# Patient Record
Sex: MALE | Race: WHITE | URBAN - METROPOLITAN AREA
[De-identification: names, ages, dates, MRNs, and addresses within clinical notes are randomized per-mention and may not be internally consistent; named-entity substitution may affect disease eponyms.]

---

## 2017-08-17 PROCEDURE — 82570 ASSAY OF URINE CREATININE: CPT | Performed by: INTERNAL MEDICINE

## 2017-08-17 PROCEDURE — 82043 UR ALBUMIN QUANTITATIVE: CPT | Performed by: INTERNAL MEDICINE

## 2017-08-21 PROBLEM — IMO0001 UNCONTROLLED TYPE 2 DIABETES MELLITUS WITHOUT COMPLICATION, WITHOUT LONG-TERM CURRENT USE OF INSULIN: Status: ACTIVE | Noted: 2017-08-21

## 2019-02-22 PROBLEM — Z72.0 TOBACCO ABUSE: Status: ACTIVE | Noted: 2019-02-22

## 2019-02-26 PROCEDURE — 82570 ASSAY OF URINE CREATININE: CPT | Performed by: INTERNAL MEDICINE

## 2019-02-26 PROCEDURE — 82043 UR ALBUMIN QUANTITATIVE: CPT | Performed by: INTERNAL MEDICINE

## 2019-02-26 PROCEDURE — 81003 URINALYSIS AUTO W/O SCOPE: CPT | Performed by: INTERNAL MEDICINE

## 2019-12-03 PROBLEM — E11.65 UNCONTROLLED TYPE 2 DIABETES MELLITUS WITH COMPLICATION (HCC): Status: ACTIVE | Noted: 2017-08-21

## 2019-12-03 PROBLEM — IMO0002 UNCONTROLLED TYPE 2 DIABETES MELLITUS WITH COMPLICATION: Status: ACTIVE | Noted: 2017-08-21

## 2019-12-03 PROBLEM — E11.8 UNCONTROLLED TYPE 2 DIABETES MELLITUS WITH COMPLICATION (HCC): Status: ACTIVE | Noted: 2017-08-21

## 2021-03-31 PROBLEM — R60.0 BILATERAL LEG EDEMA: Status: ACTIVE | Noted: 2021-03-31

## 2021-03-31 PROBLEM — E11.42 DIABETIC PERIPHERAL NEUROPATHY ASSOCIATED WITH TYPE 2 DIABETES MELLITUS (HCC): Status: ACTIVE | Noted: 2021-03-31

## 2022-02-28 PROBLEM — Z91.199 MEDICALLY NONCOMPLIANT: Status: ACTIVE | Noted: 2022-02-28

## 2024-02-21 NOTE — H&P (VIEW-ONLY)
ORTHOPEDIC SURGERY CLINIC H+P     Patient Name:Michael Moon Jr.  Date of Appointment: 02/21/24   Chief Complaint: Patient presents with:  Left Elbow - Worker's Comp, Pain    WORKERS COMPENSATION    Subjective:   HPI:    The patient is a 43 year old year old male     Previously evaluated at PCP office    Hand Surgery Intake    Occupation: [employer]   Hand Dominance: Ambidextrous    Chief Complaint and symptoms:   Onset 1/9/24  SAM - fell off ladder  Numbness and Tingling: medial elbow sensitivity - jolts at times  Pain: medial and posterior elbow    Increased pain - end range extension and flexion of elbow, certain positions    Treatment so far: XR 2/16/24    Ibuprofen - not using anymore     Things to check for in chart if considering surgery  Blood thinners: No  Diabetes: Metforim, HgbA1C 7.6 on 2/16/24  Smoking: Current (1/2 day)  EMG: no     Past Medical History  Past Medical History:   Diagnosis Date   • Alcoholic cirrhosis (HCC) Stopped ETOH 5/15.  Portal HTN, non bleeding varices on CT   • Allergic rhinitis    • Diabetes type II    • Erectile dysfunction    • hypertension    • JANET  DMG TX 12-21-10    SaO2  CPAP 10  HME       Past Surgical History  No past surgical history on file.    Medications    Current Outpatient Medications:   •  Sildenafil Citrate 100 MG Oral Tab, Take 1 tablet (100 mg total) by mouth daily as needed for Erectile Dysfunction., Disp: 60 tablet, Rfl: 1  •  mirtazapine 15 MG Oral Tab, Take 1 tablet (15 mg total) by mouth nightly. Take 10pm, Disp: 60 tablet, Rfl: 1  •  lisinopril 5 MG Oral Tab, Take 1 tablet (5 mg total) by mouth daily., Disp: 90 tablet, Rfl: 1  •  pioglitazone 30 MG Oral Tab, Take 1 tablet (30 mg total) by mouth daily., Disp: 90 tablet, Rfl: 1  •  metFORMIN  MG Oral Tablet 24 Hr, Take 2 tablets (1,500 mg total) by mouth daily with breakfast., Disp: 180 tablet, Rfl: 1  •  glimepiride 4 MG Oral Tab, Take 8mg with evening meal, Disp: 180 tablet, Rfl: 1  •  CONTOUR  NEXT TEST In Vitro Strip, Check blood sugars twice daily., Disp: 200 each, Rfl: 3  •  atorvastatin 10 MG Oral Tab, Take 1 tablet (10 mg total) by mouth daily., Disp: 90 tablet, Rfl: 3    Allergies  No Known Allergies    Social History  Social History    Tobacco Use      Smoking status: Every Day        Packs/day: 1.00        Years: 6.00        Additional pack years: 0.00        Total pack years: 6.00        Types: Cigarettes      Smokeless tobacco: Never    Vaping Use      Vaping Use: Never used    Alcohol use: No      Alcohol/week: 0.0 standard drinks of alcohol      Comment: 1-2 BOTTLES OF RUM    Drug use: No       Family History:  Family History   Problem Relation Age of Onset   • Lipids Father    • Gastro-Intestinal Disorder Mother         Crohn's disease       Physical Exam:     Estimated body mass index is 22.61 kg/m² as calculated from the following:    Height as of 2/16/24: 6' 1\" (1.854 m).    Weight as of 2/16/24: 171 lb 6.4 oz (77.7 kg).    Left Upper extremity exam:    Pertinent Exam Findings  Left elbow with swelling and gap at olecranon, inability to extend arm  Distally NVI    Diagnostic Studies:      XR Left elbow with PCP 2/16/24  IMPRESSION:   Fracture of the olecranon process with intra-articular extension and with proximal displacement of   the olecranon by about 1.6 cm.     Diagnostic studies were reviewed with the patient     Assessment/Plan      This 43 year old year old male presents with the following diagnoses and plan:    1. Olecranon fracture, left, closed, initial encounter    Left elbow olecranon fracture, subacute  I discussed options, there is articular involvement and displacement with no triceps function. He opted to move forward with surgery    To OR for left elbow olecranon ORIF    Left elbow ORIF   Work Comp  Plan for abx after surgery due to risk  PCP clearance    The patient understands the natural history of their disease and all their options.   Risks and benefits, as well as  alternatives, of surgery were discussed in detail.   Risks including infection, bleeding, damage to nerves, arteries, tendons, persistent pain, failure of surgery to relieve all pain/dysfunction/deformity, malunion, nonunion (if a bony injury), recurrence of problem, and need for further surgery were all discussed.  Risks for anesthesia complications, DVT and death were also discussed  All questions were answered and they agree to the plan.      Ottoniel Koch MD, FAAOS  [facility] Certified Orthopedic Surgeon  Board Certified Hand and Upper Extremity Specialist  [phone] Scheduling Line  [email or web address]

## 2024-02-28 RX ORDER — MIRTAZAPINE 15 MG/1
15 TABLET, FILM COATED ORAL NIGHTLY
COMMUNITY

## 2024-02-28 RX ORDER — ACETAMINOPHEN 500 MG
1000 TABLET ORAL ONCE
Status: CANCELLED | OUTPATIENT
Start: 2024-02-28 | End: 2024-02-28

## 2024-03-04 ENCOUNTER — HOSPITAL ENCOUNTER (OUTPATIENT)
Facility: HOSPITAL | Age: 44
Setting detail: HOSPITAL OUTPATIENT SURGERY
Discharge: HOME | End: 2024-03-04
Attending: ORTHOPAEDIC SURGERY | Admitting: ORTHOPAEDIC SURGERY
Payer: OTHER MISCELLANEOUS

## 2024-03-04 ENCOUNTER — APPOINTMENT (OUTPATIENT)
Dept: GENERAL RADIOLOGY | Facility: HOSPITAL | Age: 44
End: 2024-03-04
Attending: ORTHOPAEDIC SURGERY
Payer: OTHER MISCELLANEOUS

## 2024-03-04 ENCOUNTER — ANESTHESIA EVENT (OUTPATIENT)
Dept: SURGERY | Facility: HOSPITAL | Age: 44
End: 2024-03-04
Payer: OTHER MISCELLANEOUS

## 2024-03-04 ENCOUNTER — ANESTHESIA (OUTPATIENT)
Dept: SURGERY | Facility: HOSPITAL | Age: 44
End: 2024-03-04
Payer: OTHER MISCELLANEOUS

## 2024-03-04 VITALS
WEIGHT: 182 LBS | RESPIRATION RATE: 18 BRPM | DIASTOLIC BLOOD PRESSURE: 79 MMHG | OXYGEN SATURATION: 95 % | SYSTOLIC BLOOD PRESSURE: 134 MMHG | HEART RATE: 94 BPM | BODY MASS INDEX: 24.12 KG/M2 | HEIGHT: 73 IN | TEMPERATURE: 97 F

## 2024-03-04 DIAGNOSIS — S52.022A OLECRANON FRACTURE, LEFT, CLOSED, INITIAL ENCOUNTER: Primary | ICD-10-CM

## 2024-03-04 LAB
ANTIBODY SCREEN: NEGATIVE
GLUCOSE BLD-MCNC: 119 MG/DL (ref 70–99)
GLUCOSE BLD-MCNC: 194 MG/DL (ref 70–99)
RH BLOOD TYPE: POSITIVE

## 2024-03-04 PROCEDURE — 86900 BLOOD TYPING SEROLOGIC ABO: CPT | Performed by: ORTHOPAEDIC SURGERY

## 2024-03-04 PROCEDURE — 36430 TRANSFUSION BLD/BLD COMPNT: CPT | Performed by: ANESTHESIOLOGY

## 2024-03-04 PROCEDURE — 82962 GLUCOSE BLOOD TEST: CPT

## 2024-03-04 PROCEDURE — 86850 RBC ANTIBODY SCREEN: CPT | Performed by: ORTHOPAEDIC SURGERY

## 2024-03-04 PROCEDURE — 86901 BLOOD TYPING SEROLOGIC RH(D): CPT | Performed by: ORTHOPAEDIC SURGERY

## 2024-03-04 PROCEDURE — 76000 FLUOROSCOPY <1 HR PHYS/QHP: CPT | Performed by: ORTHOPAEDIC SURGERY

## 2024-03-04 PROCEDURE — 0PSL04Z REPOSITION LEFT ULNA WITH INTERNAL FIXATION DEVICE, OPEN APPROACH: ICD-10-PCS | Performed by: ORTHOPAEDIC SURGERY

## 2024-03-04 PROCEDURE — 76942 ECHO GUIDE FOR BIOPSY: CPT | Performed by: ANESTHESIOLOGY

## 2024-03-04 DEVICE — IMPLANTABLE DEVICE: Type: IMPLANTABLE DEVICE | Site: ARM | Status: FUNCTIONAL

## 2024-03-04 DEVICE — IMPLANTABLE DEVICE
Type: IMPLANTABLE DEVICE | Site: ARM | Status: NON-FUNCTIONAL
Removed: 2024-03-22

## 2024-03-04 DEVICE — DBX PUTTY, 1CC
Type: IMPLANTABLE DEVICE | Site: ARM | Status: FUNCTIONAL
Brand: DBX®

## 2024-03-04 RX ORDER — CEFAZOLIN SODIUM/WATER 2 G/20 ML
2 SYRINGE (ML) INTRAVENOUS ONCE
Status: COMPLETED | OUTPATIENT
Start: 2024-03-04 | End: 2024-03-04

## 2024-03-04 RX ORDER — HYDROMORPHONE HYDROCHLORIDE 1 MG/ML
0.6 INJECTION, SOLUTION INTRAMUSCULAR; INTRAVENOUS; SUBCUTANEOUS EVERY 5 MIN PRN
Status: DISCONTINUED | OUTPATIENT
Start: 2024-03-04 | End: 2024-03-04

## 2024-03-04 RX ORDER — SODIUM CHLORIDE 9 MG/ML
INJECTION, SOLUTION INTRAVENOUS CONTINUOUS PRN
Status: DISCONTINUED | OUTPATIENT
Start: 2024-03-04 | End: 2024-03-04 | Stop reason: SURG

## 2024-03-04 RX ORDER — LIDOCAINE HYDROCHLORIDE 10 MG/ML
INJECTION, SOLUTION EPIDURAL; INFILTRATION; INTRACAUDAL; PERINEURAL AS NEEDED
Status: DISCONTINUED | OUTPATIENT
Start: 2024-03-04 | End: 2024-03-04 | Stop reason: SURG

## 2024-03-04 RX ORDER — SCOLOPAMINE TRANSDERMAL SYSTEM 1 MG/1
1 PATCH, EXTENDED RELEASE TRANSDERMAL ONCE
Status: DISCONTINUED | OUTPATIENT
Start: 2024-03-04 | End: 2024-03-04 | Stop reason: HOSPADM

## 2024-03-04 RX ORDER — OXYCODONE HYDROCHLORIDE 5 MG/1
5 TABLET ORAL EVERY 4 HOURS PRN
Qty: 30 TABLET | Refills: 0 | Status: SHIPPED | OUTPATIENT
Start: 2024-03-04

## 2024-03-04 RX ORDER — OXYCODONE HYDROCHLORIDE 5 MG/1
5 CAPSULE ORAL EVERY 4 HOURS PRN
Qty: 25 CAPSULE | Refills: 0 | Status: SHIPPED | OUTPATIENT
Start: 2024-03-04 | End: 2024-03-04

## 2024-03-04 RX ORDER — DEXTROSE MONOHYDRATE 25 G/50ML
50 INJECTION, SOLUTION INTRAVENOUS
Status: DISCONTINUED | OUTPATIENT
Start: 2024-03-04 | End: 2024-03-04 | Stop reason: HOSPADM

## 2024-03-04 RX ORDER — SODIUM CHLORIDE, SODIUM LACTATE, POTASSIUM CHLORIDE, CALCIUM CHLORIDE 600; 310; 30; 20 MG/100ML; MG/100ML; MG/100ML; MG/100ML
INJECTION, SOLUTION INTRAVENOUS CONTINUOUS
Status: DISCONTINUED | OUTPATIENT
Start: 2024-03-04 | End: 2024-03-04

## 2024-03-04 RX ORDER — DEXAMETHASONE SODIUM PHOSPHATE 10 MG/ML
INJECTION, SOLUTION INTRAMUSCULAR; INTRAVENOUS AS NEEDED
Status: DISCONTINUED | OUTPATIENT
Start: 2024-03-04 | End: 2024-03-04 | Stop reason: SURG

## 2024-03-04 RX ORDER — MIDAZOLAM HYDROCHLORIDE 1 MG/ML
INJECTION INTRAMUSCULAR; INTRAVENOUS AS NEEDED
Status: DISCONTINUED | OUTPATIENT
Start: 2024-03-04 | End: 2024-03-04 | Stop reason: SURG

## 2024-03-04 RX ORDER — NALOXONE HYDROCHLORIDE 0.4 MG/ML
80 INJECTION, SOLUTION INTRAMUSCULAR; INTRAVENOUS; SUBCUTANEOUS AS NEEDED
Status: DISCONTINUED | OUTPATIENT
Start: 2024-03-04 | End: 2024-03-04

## 2024-03-04 RX ORDER — ROPIVACAINE HYDROCHLORIDE 5 MG/ML
INJECTION, SOLUTION EPIDURAL; INFILTRATION; PERINEURAL AS NEEDED
Status: DISCONTINUED | OUTPATIENT
Start: 2024-03-04 | End: 2024-03-04 | Stop reason: SURG

## 2024-03-04 RX ORDER — MEPERIDINE HYDROCHLORIDE 25 MG/ML
12.5 INJECTION INTRAMUSCULAR; INTRAVENOUS; SUBCUTANEOUS AS NEEDED
Status: DISCONTINUED | OUTPATIENT
Start: 2024-03-04 | End: 2024-03-04

## 2024-03-04 RX ORDER — HYDROMORPHONE HYDROCHLORIDE 1 MG/ML
0.2 INJECTION, SOLUTION INTRAMUSCULAR; INTRAVENOUS; SUBCUTANEOUS EVERY 5 MIN PRN
Status: DISCONTINUED | OUTPATIENT
Start: 2024-03-04 | End: 2024-03-04

## 2024-03-04 RX ORDER — ONDANSETRON 2 MG/ML
4 INJECTION INTRAMUSCULAR; INTRAVENOUS EVERY 6 HOURS PRN
Status: DISCONTINUED | OUTPATIENT
Start: 2024-03-04 | End: 2024-03-04

## 2024-03-04 RX ORDER — DIPHENHYDRAMINE HYDROCHLORIDE 50 MG/ML
12.5 INJECTION INTRAMUSCULAR; INTRAVENOUS AS NEEDED
Status: DISCONTINUED | OUTPATIENT
Start: 2024-03-04 | End: 2024-03-04

## 2024-03-04 RX ORDER — PROCHLORPERAZINE EDISYLATE 5 MG/ML
5 INJECTION INTRAMUSCULAR; INTRAVENOUS EVERY 8 HOURS PRN
Status: DISCONTINUED | OUTPATIENT
Start: 2024-03-04 | End: 2024-03-04

## 2024-03-04 RX ORDER — NICOTINE POLACRILEX 4 MG
30 LOZENGE BUCCAL
Status: DISCONTINUED | OUTPATIENT
Start: 2024-03-04 | End: 2024-03-04 | Stop reason: HOSPADM

## 2024-03-04 RX ORDER — ONDANSETRON 2 MG/ML
INJECTION INTRAMUSCULAR; INTRAVENOUS AS NEEDED
Status: DISCONTINUED | OUTPATIENT
Start: 2024-03-04 | End: 2024-03-04 | Stop reason: SURG

## 2024-03-04 RX ORDER — NICOTINE POLACRILEX 4 MG
15 LOZENGE BUCCAL
Status: DISCONTINUED | OUTPATIENT
Start: 2024-03-04 | End: 2024-03-04 | Stop reason: HOSPADM

## 2024-03-04 RX ORDER — MIDAZOLAM HYDROCHLORIDE 1 MG/ML
1 INJECTION INTRAMUSCULAR; INTRAVENOUS EVERY 5 MIN PRN
Status: DISCONTINUED | OUTPATIENT
Start: 2024-03-04 | End: 2024-03-04

## 2024-03-04 RX ORDER — HYDROMORPHONE HYDROCHLORIDE 1 MG/ML
0.4 INJECTION, SOLUTION INTRAMUSCULAR; INTRAVENOUS; SUBCUTANEOUS EVERY 5 MIN PRN
Status: DISCONTINUED | OUTPATIENT
Start: 2024-03-04 | End: 2024-03-04

## 2024-03-04 RX ORDER — DEXAMETHASONE SODIUM PHOSPHATE 4 MG/ML
VIAL (ML) INJECTION AS NEEDED
Status: DISCONTINUED | OUTPATIENT
Start: 2024-03-04 | End: 2024-03-04 | Stop reason: SURG

## 2024-03-04 RX ORDER — LABETALOL HYDROCHLORIDE 5 MG/ML
10 INJECTION, SOLUTION INTRAVENOUS EVERY 10 MIN PRN
Status: DISCONTINUED | OUTPATIENT
Start: 2024-03-04 | End: 2024-03-04

## 2024-03-04 RX ORDER — OXYCODONE HYDROCHLORIDE 5 MG/1
5 TABLET ORAL ONCE AS NEEDED
Status: DISCONTINUED | OUTPATIENT
Start: 2024-03-04 | End: 2024-03-04

## 2024-03-04 RX ADMIN — LIDOCAINE HYDROCHLORIDE 25 MG: 10 INJECTION, SOLUTION EPIDURAL; INFILTRATION; INTRACAUDAL; PERINEURAL at 10:40:00

## 2024-03-04 RX ADMIN — CEFAZOLIN SODIUM/WATER 2 G: 2 G/20 ML SYRINGE (ML) INTRAVENOUS at 10:46:00

## 2024-03-04 RX ADMIN — MIDAZOLAM HYDROCHLORIDE 2 MG: 1 INJECTION INTRAMUSCULAR; INTRAVENOUS at 10:33:00

## 2024-03-04 RX ADMIN — DEXAMETHASONE SODIUM PHOSPHATE 2 MG: 10 INJECTION, SOLUTION INTRAMUSCULAR; INTRAVENOUS at 10:38:00

## 2024-03-04 RX ADMIN — ROPIVACAINE HYDROCHLORIDE 25 ML: 5 INJECTION, SOLUTION EPIDURAL; INFILTRATION; PERINEURAL at 10:38:00

## 2024-03-04 RX ADMIN — DEXAMETHASONE SODIUM PHOSPHATE 8 MG: 4 MG/ML VIAL (ML) INJECTION at 10:58:00

## 2024-03-04 RX ADMIN — SODIUM CHLORIDE: 9 INJECTION, SOLUTION INTRAVENOUS at 10:31:00

## 2024-03-04 RX ADMIN — ONDANSETRON 4 MG: 2 INJECTION INTRAMUSCULAR; INTRAVENOUS at 11:31:00

## 2024-03-04 NOTE — ANESTHESIA PREPROCEDURE EVALUATION
PRE-OP EVALUATION    Patient Name: Michael Moon Jr.    Admit Diagnosis: OLECRANON FRACTURE, LEFT CLOSED, INITIAL ENCOUNTER    Pre-op Diagnosis: OLECRANON FRACTURE, LEFT CLOSED, INITIAL ENCOUNTER    LEFT ELBOW OLECRANON OPEN REDUCTION INTERNAL FIXATION    Anesthesia Procedure: LEFT ELBOW OLECRANON OPEN REDUCTION INTERNAL FIXATION (Left)    Surgeon(s) and Role:     * Ottoniel Koch MD - Primary    Pre-op vitals reviewed.  Temp: 98.4 °F (36.9 °C)  Pulse: 91  Resp: 16  BP: 136/87  SpO2: 99 %  Body mass index is 24.01 kg/m².    Current medications reviewed.  Hospital Medications:   scopolamine (Transderm-Scop) 1 MG/3DAYS patch 1 patch  1 patch Transdermal Once    glucose (Dex4) 15 GM/59ML oral liquid 15 g  15 g Oral Q15 Min PRN    Or    glucose (Glutose) 40% oral gel 15 g  15 g Oral Q15 Min PRN    Or    glucose-vitamin C (Dex-4) chewable tab 4 tablet  4 tablet Oral Q15 Min PRN    Or    dextrose 50% injection 50 mL  50 mL Intravenous Q15 Min PRN    Or    glucose (Dex4) 15 GM/59ML oral liquid 30 g  30 g Oral Q15 Min PRN    Or    glucose (Glutose) 40% oral gel 30 g  30 g Oral Q15 Min PRN    Or    glucose-vitamin C (Dex-4) chewable tab 8 tablet  8 tablet Oral Q15 Min PRN    lactated ringers infusion   Intravenous Continuous    ceFAZolin (Ancef) 2 g in 20mL IV syringe premix  2 g Intravenous Once       Outpatient Medications:     Medications Prior to Admission   Medication Sig Dispense Refill Last Dose    mirtazapine 15 MG Oral Tab Take 1 tablet (15 mg total) by mouth nightly.   Past Week    Sildenafil Citrate 100 MG Oral Tab Take 1 tablet (100 mg total) by mouth daily as needed for Erectile Dysfunction. 60 tablet 3     lisinopril 5 MG Oral Tab Take 1 tablet (5 mg total) by mouth daily. 90 tablet 3 3/3/2024 at 0800    pioglitazone 30 MG Oral Tab Take 1 tablet (30 mg total) by mouth daily. 90 tablet 3 3/3/2024 at 0800    glimepiride 4 MG Oral Tab Take 2 tablets (8 mg total) by mouth once daily. 180 tablet 3 3/3/2024 at  0800    metFORMIN HCl  MG Oral Tablet 24 Hr Take 2 tablets (1,500 mg total) by mouth once daily. 180 tablet 3 3/3/2024 at 0800    Insulin Pen Needle (PEN NEEDLES 5/16\") 31G X 8 MM Does not apply Misc Use daily with insulin 100 each 3     Elastic Bandages & Supports (JOBST FOR MEN 15-20MMHG LG) Does not apply Misc Wear both legs daily 4 each 5     FREESTYLE LITE TEST In Vitro Strip TEST DAILY 100 strip 3     Blood Glucose Monitoring Suppl (FREESTYLE FREEDOM LITE) w/Device Does not apply Kit Test blood sugars once daily 1 kit 0     FREESTYLE LANCETS Does not apply Misc 1 each by Finger stick route daily. 100 each 2        Allergies: Patient has no known allergies.      Anesthesia Evaluation    Patient summary reviewed.    Anesthetic Complications  (-) history of anesthetic complications         GI/Hepatic/Renal                (+) liver disease (alcoholic cirrhosis.  Stopped ETOH 5/15.  Portal HTN, non bleeding varices on CT)                 Cardiovascular      ECG reviewed.  Exercise tolerance: good     MET: >4      (+) hypertension                                     Endo/Other      (+) diabetes and poorly controlled, type 2, not using insulin              (+) thrombocytopenia           Pulmonary  Comment: Smokes 1ppd for 6 years                  (+) sleep apnea and CPAP      Neuro/Psych      (+) depression  (+) anxiety         (+) neuromuscular disease (diabteic neuropathy)                   Past Surgical History:   Procedure Laterality Date    SINUS SURGERY        WISDOM TEETH REMOVED       Social History     Socioeconomic History    Marital status: Single   Tobacco Use    Smoking status: Every Day     Packs/day: 0.50     Years: 6.00     Additional pack years: 0.00     Total pack years: 3.00     Types: Cigarettes    Smokeless tobacco: Never   Vaping Use    Vaping Use: Never used   Substance and Sexual Activity    Alcohol use: No     Alcohol/week: 0.0 standard drinks of alcohol     Comment: 1-2 BOTTLES OF RUM     Drug use: No     History   Drug Use No     Available pre-op labs reviewed.               Airway      Mallampati: II  Mouth opening: >3 FB  TM distance: > 6 cm  Neck ROM: full Cardiovascular    Cardiovascular exam normal.  Rhythm: regular  Rate: normal  (-) murmur   Dental    Dentition appears grossly intact         Pulmonary    Pulmonary exam normal.  Breath sounds clear to auscultation bilaterally.               Other findings        ASA: 3   Plan: general  NPO status verified and patient meets guidelines.      Surgeon requests: regional block  Comment: GA discussed.  Risk of PONV, cough, sore throat explained.  Supraclavicular block discussed with patient. The risks and benefits of regional anesthesia have been explained to the patient as indicated on the anesthesia consent form, which has been signed. All questions answered.        Plan/risks discussed with: patient  Use of blood product(s) discussed with: patient (platelets ordered by surgeon)            Present on Admission:  **None**

## 2024-03-04 NOTE — INTERVAL H&P NOTE
Pre-op Diagnosis: OLECRANON FRACTURE, LEFT CLOSED, INITIAL ENCOUNTER    The above referenced H&P was reviewed by Ottoniel Koch MD on 3/4/2024, the patient was examined and no significant changes have occurred in the patient's condition since the H&P was performed.  I discussed with the patient and/or legal representative the potential benefits, risks and side effects of this procedure; the likelihood of the patient achieving goals; and potential problems that might occur during recuperation.  I discussed reasonable alternatives to the procedure, including risks, benefits and side effects related to the alternatives and risks related to not receiving this procedure.  We will proceed with procedure as planned.    1 pack FFP   1 pack platelets preop    Patient was seen and examined  Patient is in no acute distress  Regular rate and rhythm  Unlabored respiration    Ottoniel Koch MD  [facility] Certified Orthopedic Surgeon  Hand and Upper Extremity Specialist  [phone] Scheduling Line  [email or web address]

## 2024-03-04 NOTE — DISCHARGE INSTRUCTIONS
Ottoniel Koch MD  Hand and Upper Extremity Surgeon  [facility]  [email or web address]    Keep splint on and dry until Postop appointment or OT  Oxycodone for pain, transition to aleve    POST-OPERATIVE INSTRUCTIONS    Please visit [email or web address] to learn more about your condition, procedure, preoperative and postoperative care. There are also directions for postoperative pain control and home exercises. Look under the patient resources tab.    Follow up  Your follow up should have been made for you before the surgery  If you do not have a follow up day or have questions regarding it, call [phone]    Therapy:   If we discussed the need for therapy, they will reach out to you   Please call [phone] if they do not reach out to you in 2-3 days  If we discussed seeing therapy before your postoperative visit, please schedule accordingly    You may be placed in one of the following dressings after surgery:  (yes) Cast/Splint: please keep your cast/splint clean and dry until follow up. Place a bag over it when you shower. If this gets wet, please call our office immediately.       Showers  Keep dressing and/or wound dry until you see me in office    Activity  Please remain non-weight bearing to the operative extremity with only gentle activity, make a fist often and move fingers.    Driving  You should refrain from driving if you are on narcotic pain medications (Tramadol, Norco, Percocet… etc.). If you are off narcotic pain medications, you may start driving when you feel comfortable and safe doing so. Please use caution before starting to drive again.    Pain Medication Plan  Your pain medications should have been prescribed electronically to your pharmacy  The pain medications I have recommended for your procedure are Oxycodone, Transition to aleve or ibuprofen  Please call [phone] if you have issues with these medications    Over the counter pain medications  Start with over the counter  pain medications first.  Naproxen (Brand name Aleve)   Comes in 220 mg tablets  Take this once in the morning and once at night initially, with food  If this does not control your pain, you can take 2 pills in the morning and 2 at night.   Do not take this if you have medical conditions affecting your heart/kidneys  Docusate Sodium (Brand name Colace)  This medication will help you if you become constipated from narcotic pain medications    Medications you may have been prescribed  If you were prescribed Norco, Vicodin, or Percocet, these should be taken to assist with sleeping at night, only if needed, for the first week. You may be given a written prescription, but many patients find they do not need to take these. Narcotic pain medications can lead to addiction, respiratory sedation, and death. Try to take these as little as possible if you can.    While on narcotic pain medication,  please refrain from:  Driving  Operating Heavy Machinery/Power Tools  Drinking alcohol  Staying by one's self  Making important decisions or signing legal documents    If you have any of the following signs or symptoms please proceed to your nearest Emergency Room:   Chest Pain  Shortness of Breath/ Difficulty Breathing  Excessive Bleeding  Anything else you deem an emergency    Please call the surgical team if you have the following:   Excessive swelling  Foul smelling odor from your wounds or dressings  Discharge from your surgical wounds  Persistent pain that does not get better with the prescription pain medication  Persistent nausea and/or vomiting  Fevers greater than 101.1 after the first 48 hrs post op  Dramatic changes to you post operative pain    You can resume all of your home medications after surgery with the following exceptions:   Immune Modulating Drugs: These are drugs for rheumatoid arthritis, psoriasis and chemotherapy. If you are taking these drugs please check with the surgical team.   Steroids: please ask the  surgical team when to restart your normal steroid doses.   Blood thinners: these are medications like coumadin may be held post operatively in some cases, please check with your surgical team or your surgeon's nurse.      Please do not hesitate to contact us with questions or concerns:   If you have issues during the day, please call the orthopedic clinic at [phone]  If you have urgent questions after hours please call [phone] and ask to page the PA on call    Please visit [email or web address] to learn more about your condition, procedure, preoperative and postoperative care. There are also directions for postoperative pain control and home exercises.    If you’ve been happy with your care, please fill out our Press Ganey Survey emailed by [employer] or write a review on Google for me (link can be pulled up with the QR code below).   We appreciate all feedback!      Scan me or take a photo to write a Google Review!

## 2024-03-04 NOTE — OPERATIVE REPORT
OPERATIVE REPORT    Patient Name: Michael Moon Jr.  Age:  43 year old  Sex: male  MRN: [id number]  : 10/28/1980  Date of Admission: 3/4/2024  Date of Surgery: 24  Primary Surgeon: Ottoniel Koch MD  Assistant(s): None     Preoperative Diagnosis:   Left elbow olecranon fracture     Postoperative Diagnosis:   Above      Operation Performed: Left olecranon proximal ulna open reduction internal fixation, CPT code 89063     Implants: Skeletal dynamics proximal ulna plate     Anesthesia: General + regional    Complications: none    Estimated Blood Loss: 10 mL    Tourniquet Time: 50 minutes    Specimens: none    Antibiotics: given    INDICATIONS FOR SURGERY:   The patient is a 43 year old year-old male with left elbow olecranon fracture.  The risks of surgery include, but are not limited to, bleeding, infection, injury to neurovascular structures, DVT, PE, other medical complications. The patient understood all of these and gave consent to proceed freely.     OPERATIVE FINDINGS:  Left elbow olecranon fracture     OPERATIVE PROCEDURE:  The patient was seen in the preoperative holding area, where the correct site was marked.  All questions regarding the procedure and postoperative rehabilitation were discussed.  The consent was signed. H&P and allergies were reviewed.     The patient was brought to the operating room and transferred to the OR table where general Anesthesia was induced without immediate complication. The anesthesia team then performed a regional block on the upper extremity without complications. General anesthetic was then initiated by Anesthesia. A high axillary tourniquet that was well padded was placed on the operative arm. The patient was then positioned with a bump under the hip and the operative upper extremity was prepped and draped  the standard fashion. A single dose of preoperative antibiotics was administered. A time-out was performed confirming laterality and site.     A midline  incision overlying the olecranon and proximal ulna was designed and marked. This incision curved radially around the tip of the olecranon. The extremity was then exsanguinated at 250 mm hg. Skin was incised sharply. The subcutaneous tissue proximally was dissected with bovie cautery until the triceps musculature was encountered. Flaps ulnar and radially were lifted. Distally, the dissection was brought down to the distal ulna and the ECU and FCU fascia were lifted off the ulnar shaft     The fracture was encountered and fracture hematoma was washed away with irrigation as well as with a freer elevator.   The fracture was then reduced with a pointed reduction clamp and two K wires were fired in crossed fashion to hold the provisional reduction.     Reduction was confirmed by fluoroscopy. A left olecranon plate was then selected and placed on the ulna and secured with K wires. Plate position was confirmed and deemed adequate. A distal cortical screw was then placed to bring the plate to the bone and then the proximal home run screw was placed in locking fashion.      Xrays were then taken and screw and plate position were confirmed.   I placed 4 additional proximal locking screws  I placed 3 additional distal locking screws   Final films were taken and deemed adequate    A crossing 2-0 fiberwire locking stitch was placed through the ulna into the triceps     The wound was irrigated, the distal forearm fascia was closed with a running 2-0 vicryl, subdermal layer closed with 3-0 monocryl and then skin was closed with staples     Wound was dressed with bacitracin, adaptic, 4x4s and webril. A long arm well padded post mold splint was placed with a side slab.     The patient was allowed to awaken from the anesthetic, appeared to have tolerated the procedure well without immediate complication, and was transferred to the recovery room in stable condition.     Patient will follow up in 2 weeks for wound inspection. Norco  given for pain     Ottoniel Koch MD  Orthopedic Surgery

## 2024-03-04 NOTE — ANESTHESIA PROCEDURE NOTES
Airway  Date/Time: 3/4/2024 10:42 AM  Urgency: elective      General Information and Staff    Patient location during procedure: OR  Anesthesiologist: Jonnie Caro MD  Performed: anesthesiologist   Performed by: Jonnie Caro MD  Authorized by: Jonnie Caro MD      Indications and Patient Condition  Indications for airway management: anesthesia  Sedation level: deep  Preoxygenated: yes  Patient position: sniffing  Mask difficulty assessment: 1 - vent by mask    Final Airway Details  Final airway type: supraglottic airway      Successful airway: classic  Size 4       Number of attempts at approach: 1

## 2024-03-04 NOTE — ANESTHESIA PROCEDURE NOTES
Regional Block    Date/Time: 3/4/2024 10:31 AM    Performed by: Jonnie Caro MD  Authorized by: Jonnie Caro MD      General Information and Staff    Start Time:  3/4/2024 10:31 AM  End Time:  3/4/2024 10:39 AM  Anesthesiologist:  Jonnie Caro MD  Performed by:  Anesthesiologist  Patient Location:  OR    Block Placement: Pre Induction  Site Identification: real time ultrasound guided and image stored and retrievable    Block site/laterality marked before start: site marked  Reason for Block: at surgeon's request and post-op pain management    Preanesthetic Checklist: 2 patient identifers, IV checked, risks and benefits discussed, monitors and equipment checked, pre-op evaluation, timeout performed, anesthesia consent, sterile technique used, no prohibitive neurological deficits and no local skin infection at insertion site      Procedure Details    Patient Position:  Supine  Prep: ChloraPrep    Monitoring:  Cardiac monitor, continuous pulse ox and blood pressure cuff  Block Type:  Supraclavicular  Laterality:  Left  Injection Technique:  Single-shot    Needle    Needle Type:  Short-bevel and echogenic  Needle Gauge:  21 G  Needle Length:  100 mm  Needle Localization:  Ultrasound guidance  Reason for Ultrasound Use: appropriate spread of the medication was noted in real time and no ultrasound evidence of intravascular and/or intraneural injection            Assessment    Injection Assessment:  Good spread noted, negative resistance, negative aspiration for heme, incremental injection and low pressure  Heart Rate Change: No    - Patient tolerated block procedure well without evidence of immediate block related complications.     Medications  3/4/2024 10:31 AM      Additional Comments    Medication:  Ropivacaine 0.5% 25mL with 2 mg PF dexamethasone

## 2024-03-05 LAB
BLOOD TYPE BARCODE: 600
UNIT VOLUME: 208 ML

## 2024-03-07 LAB
BLOOD TYPE BARCODE: 9500
UNIT VOLUME: 302 ML

## 2024-03-19 NOTE — H&P (VIEW-ONLY)
ORTHOPEDIC SURGERY CLINIC NOTE     Patient Name: Michael Moon Jr.  Date of Appointment: 03/19/24   Surgery Performed:   Left elbow olecranon ORIF    Surgery Date: 3/1/24  Patient presents with:  Left Elbow - Post-Op, Worker's Comp     WORKER'S COMPENSATION    History of Present Illness  The patient is a 43 year old year old male s/p surgery listed above.    Patient reports LEFT elbow feeling okay. Minimal pain, currently not using any medications for pain. No numbness nor tingling. Swelling improved.     Had first OT visit 3/15/24. Continues to use splint. Notes that the splint seems to slip.    Denies new injury nor new symptoms, nor f/s/c/n/v.    No questionnaires on file.     Past Medical History:   Diagnosis Date   • Alcoholic cirrhosis (HCC) Stopped ETOH 5/15.  Portal HTN, non bleeding varices on CT   • Allergic rhinitis    • Anxiety state    • Depression    • Diabetes type II    • Erectile dysfunction    • Esophageal reflux    • High blood pressure    • High cholesterol    • hypertension    • Neuropathy     legs / feet   • JANET  DMG TX 12-21-10    SaO2  CPAP 10  HME        PHYSICAL EXAM     Upper extremity exam:  Left elbow with healed posterior incision, swelling appropriate, no infection   Distally NVI    Skin intact  Compartments soft  Motor Exam   Palmar Abduction, EPL, FPL, FDS and FDP, First dorsal interossei intact   Sensory Exam   Sensation intact to light touch to Median, Ulnar and Radial Nerve distribution  Radial/Ulnar Pulses 2+, Cap Refill < 2 seconds     Diagnostic Studies:      3 views of left elbow shows pull out of the olecranon piece as compared to the pre-operative xray    Diagnostic studies were reviewed with the patient     Assessment/Plan      This 43 year old year old male presents with the following diagnoses and plan:    1. Olecranon fracture, left, closed, with routine healing, subsequent encounter    2. S/P ORIF (open reduction internal fixation) fracture    I had a long  conversation with the patient  He has a failed olecranon ORIF, at some point between surgery and now, there was likely increased motion at the elbow and the olecranon piece pulled out from the plate.     I gave him options but would recommend revision surgery at this time    He was agreeable with this plan    Recommend left elbow revision olecranon open reduction internal fixation, plate removal, possible triceps advancement     Plan to stay in splint for 4 weeks to ensure healing before transitioning for OT    The patient understands the natural history of their disease and all their options.   Risks and benefits, as well as alternatives, of surgery were discussed in detail.   Risks including infection, bleeding, damage to nerves, arteries, tendons, persistent pain, failure of surgery to relieve all pain/dysfunction/deformity, malunion, nonunion (if a bony injury), recurrence of problem, and need for further surgery were all discussed.  Risks for anesthesia complications, DVT and death were also discussed  All questions were answered and they agree to the plan.       Ottoniel Koch MD, FAAOS  [facility] Certified Orthopedic Surgeon  Board Certified Hand and Upper Extremity Specialist  [phone] Scheduling Line  [email or web address]

## 2024-03-21 ENCOUNTER — ANESTHESIA EVENT (OUTPATIENT)
Dept: SURGERY | Facility: HOSPITAL | Age: 44
End: 2024-03-21
Payer: OTHER MISCELLANEOUS

## 2024-03-21 RX ORDER — SERTRALINE HYDROCHLORIDE 25 MG/1
25 TABLET, FILM COATED ORAL DAILY
COMMUNITY
Start: 2024-03-04

## 2024-03-21 NOTE — PAT NURSING NOTE
S/W Abigail at Dr. Koch's office. Abigail left [employer] message for patient. Informed her that  RN unable to reach patient to verify medical history.

## 2024-03-21 NOTE — PAT NURSING NOTE
LM with Dr. Koch's office [phone], RN unable to reach patient to verify medical history, medical screen not completed. Patient is scheduled at 1100 on 3/22/24

## 2024-03-22 ENCOUNTER — HOSPITAL ENCOUNTER (OUTPATIENT)
Facility: HOSPITAL | Age: 44
Setting detail: HOSPITAL OUTPATIENT SURGERY
Discharge: HOME | End: 2024-03-22
Attending: ORTHOPAEDIC SURGERY | Admitting: ORTHOPAEDIC SURGERY
Payer: OTHER MISCELLANEOUS

## 2024-03-22 ENCOUNTER — ANESTHESIA (OUTPATIENT)
Dept: SURGERY | Facility: HOSPITAL | Age: 44
End: 2024-03-22
Payer: OTHER MISCELLANEOUS

## 2024-03-22 ENCOUNTER — APPOINTMENT (OUTPATIENT)
Dept: GENERAL RADIOLOGY | Facility: HOSPITAL | Age: 44
End: 2024-03-22
Attending: ORTHOPAEDIC SURGERY
Payer: OTHER MISCELLANEOUS

## 2024-03-22 VITALS
WEIGHT: 180.19 LBS | RESPIRATION RATE: 14 BRPM | BODY MASS INDEX: 23.88 KG/M2 | HEIGHT: 73 IN | TEMPERATURE: 98 F | SYSTOLIC BLOOD PRESSURE: 121 MMHG | DIASTOLIC BLOOD PRESSURE: 72 MMHG | OXYGEN SATURATION: 95 % | HEART RATE: 78 BPM

## 2024-03-22 DIAGNOSIS — S52.022A OLECRANON FRACTURE, LEFT, CLOSED, INITIAL ENCOUNTER: Primary | ICD-10-CM

## 2024-03-22 LAB
ALBUMIN SERPL-MCNC: 2.9 G/DL (ref 3.4–5)
ALBUMIN/GLOB SERPL: 0.9 {RATIO} (ref 1–2)
ALP LIVER SERPL-CCNC: 175 U/L
ALT SERPL-CCNC: 24 U/L
ANION GAP SERPL CALC-SCNC: 5 MMOL/L (ref 0–18)
AST SERPL-CCNC: 19 U/L (ref 15–37)
BILIRUB SERPL-MCNC: 1 MG/DL (ref 0.1–2)
BUN BLD-MCNC: 9 MG/DL (ref 9–23)
CALCIUM BLD-MCNC: 8.8 MG/DL (ref 8.5–10.1)
CHLORIDE SERPL-SCNC: 112 MMOL/L (ref 98–112)
CO2 SERPL-SCNC: 25 MMOL/L (ref 21–32)
CREAT BLD-MCNC: 0.57 MG/DL
EGFRCR SERPLBLD CKD-EPI 2021: 126 ML/MIN/1.73M2 (ref 60–?)
GLOBULIN PLAS-MCNC: 3.3 G/DL (ref 2.8–4.4)
GLUCOSE BLD-MCNC: 134 MG/DL (ref 70–99)
GLUCOSE BLD-MCNC: 176 MG/DL (ref 70–99)
GLUCOSE BLD-MCNC: 190 MG/DL (ref 70–99)
OSMOLALITY SERPL CALC.SUM OF ELEC: 298 MOSM/KG (ref 275–295)
POTASSIUM SERPL-SCNC: 3.8 MMOL/L (ref 3.5–5.1)
PROT SERPL-MCNC: 6.2 G/DL (ref 6.4–8.2)
SODIUM SERPL-SCNC: 142 MMOL/L (ref 136–145)

## 2024-03-22 PROCEDURE — 82962 GLUCOSE BLOOD TEST: CPT

## 2024-03-22 PROCEDURE — 0LM40ZZ REATTACHMENT OF LEFT UPPER ARM TENDON, OPEN APPROACH: ICD-10-PCS | Performed by: ORTHOPAEDIC SURGERY

## 2024-03-22 PROCEDURE — 76942 ECHO GUIDE FOR BIOPSY: CPT | Performed by: ANESTHESIOLOGY

## 2024-03-22 PROCEDURE — 0PSL04Z REPOSITION LEFT ULNA WITH INTERNAL FIXATION DEVICE, OPEN APPROACH: ICD-10-PCS | Performed by: ORTHOPAEDIC SURGERY

## 2024-03-22 PROCEDURE — 76000 FLUOROSCOPY <1 HR PHYS/QHP: CPT | Performed by: ORTHOPAEDIC SURGERY

## 2024-03-22 PROCEDURE — 80053 COMPREHEN METABOLIC PANEL: CPT | Performed by: ANESTHESIOLOGY

## 2024-03-22 DEVICE — IMPLANTABLE DEVICE: Type: IMPLANTABLE DEVICE | Site: ARM | Status: FUNCTIONAL

## 2024-03-22 DEVICE — DEMINERALIZED BONE MATRIX (DBM) IN A LIPID CARRIER
Type: IMPLANTABLE DEVICE | Site: ARM | Status: FUNCTIONAL
Brand: STAGRAFT DBM PUTTY

## 2024-03-22 DEVICE — K WIRE 1.6X150MM TRCR PT SS: Type: IMPLANTABLE DEVICE | Site: ARM

## 2024-03-22 DEVICE — SCREW BNE 3.5X30MM CORT HEX DRV RECESS FT ST: Type: IMPLANTABLE DEVICE | Site: ARM | Status: FUNCTIONAL

## 2024-03-22 DEVICE — SCREW BNE 2.7X18MM VA T8 STARDRV RECESS LOK: Type: IMPLANTABLE DEVICE | Site: ARM | Status: FUNCTIONAL

## 2024-03-22 DEVICE — IB KIT, BC, W/ CC FT AND JUMPSTART
Type: IMPLANTABLE DEVICE | Site: ARM | Status: FUNCTIONAL
Brand: ARTHREX®

## 2024-03-22 RX ORDER — METOCLOPRAMIDE HYDROCHLORIDE 5 MG/ML
INJECTION INTRAMUSCULAR; INTRAVENOUS AS NEEDED
Status: DISCONTINUED | OUTPATIENT
Start: 2024-03-22 | End: 2024-03-22 | Stop reason: SURG

## 2024-03-22 RX ORDER — INSULIN ASPART 100 [IU]/ML
INJECTION, SOLUTION INTRAVENOUS; SUBCUTANEOUS ONCE
Status: DISCONTINUED | OUTPATIENT
Start: 2024-03-22 | End: 2024-03-22

## 2024-03-22 RX ORDER — HYDROMORPHONE HYDROCHLORIDE 1 MG/ML
0.6 INJECTION, SOLUTION INTRAMUSCULAR; INTRAVENOUS; SUBCUTANEOUS EVERY 5 MIN PRN
Status: DISCONTINUED | OUTPATIENT
Start: 2024-03-22 | End: 2024-03-22

## 2024-03-22 RX ORDER — DEXAMETHASONE SODIUM PHOSPHATE 10 MG/ML
INJECTION, SOLUTION INTRAMUSCULAR; INTRAVENOUS AS NEEDED
Status: DISCONTINUED | OUTPATIENT
Start: 2024-03-22 | End: 2024-03-22 | Stop reason: SURG

## 2024-03-22 RX ORDER — HYDROMORPHONE HYDROCHLORIDE 1 MG/ML
0.4 INJECTION, SOLUTION INTRAMUSCULAR; INTRAVENOUS; SUBCUTANEOUS EVERY 5 MIN PRN
Status: DISCONTINUED | OUTPATIENT
Start: 2024-03-22 | End: 2024-03-22

## 2024-03-22 RX ORDER — ACETAMINOPHEN 500 MG
1000 TABLET ORAL ONCE AS NEEDED
Status: DISCONTINUED | OUTPATIENT
Start: 2024-03-22 | End: 2024-03-22

## 2024-03-22 RX ORDER — NICOTINE POLACRILEX 4 MG
30 LOZENGE BUCCAL
Status: DISCONTINUED | OUTPATIENT
Start: 2024-03-22 | End: 2024-03-22 | Stop reason: HOSPADM

## 2024-03-22 RX ORDER — SCOLOPAMINE TRANSDERMAL SYSTEM 1 MG/1
1 PATCH, EXTENDED RELEASE TRANSDERMAL ONCE
Status: DISCONTINUED | OUTPATIENT
Start: 2024-03-22 | End: 2024-03-22 | Stop reason: HOSPADM

## 2024-03-22 RX ORDER — LIDOCAINE HYDROCHLORIDE 10 MG/ML
INJECTION, SOLUTION EPIDURAL; INFILTRATION; INTRACAUDAL; PERINEURAL AS NEEDED
Status: DISCONTINUED | OUTPATIENT
Start: 2024-03-22 | End: 2024-03-22 | Stop reason: SURG

## 2024-03-22 RX ORDER — SODIUM CHLORIDE, SODIUM LACTATE, POTASSIUM CHLORIDE, CALCIUM CHLORIDE 600; 310; 30; 20 MG/100ML; MG/100ML; MG/100ML; MG/100ML
INJECTION, SOLUTION INTRAVENOUS CONTINUOUS
Status: DISCONTINUED | OUTPATIENT
Start: 2024-03-22 | End: 2024-03-22

## 2024-03-22 RX ORDER — ONDANSETRON 2 MG/ML
INJECTION INTRAMUSCULAR; INTRAVENOUS AS NEEDED
Status: DISCONTINUED | OUTPATIENT
Start: 2024-03-22 | End: 2024-03-22 | Stop reason: SURG

## 2024-03-22 RX ORDER — ROPIVACAINE HYDROCHLORIDE 5 MG/ML
INJECTION, SOLUTION EPIDURAL; INFILTRATION; PERINEURAL AS NEEDED
Status: DISCONTINUED | OUTPATIENT
Start: 2024-03-22 | End: 2024-03-22 | Stop reason: SURG

## 2024-03-22 RX ORDER — HYDROCODONE BITARTRATE AND ACETAMINOPHEN 5; 325 MG/1; MG/1
2 TABLET ORAL ONCE AS NEEDED
Status: DISCONTINUED | OUTPATIENT
Start: 2024-03-22 | End: 2024-03-22

## 2024-03-22 RX ORDER — HYDROCODONE BITARTRATE AND ACETAMINOPHEN 5; 325 MG/1; MG/1
1 TABLET ORAL EVERY 6 HOURS PRN
Qty: 20 TABLET | Refills: 0 | Status: SHIPPED | OUTPATIENT
Start: 2024-03-22

## 2024-03-22 RX ORDER — DEXTROSE MONOHYDRATE 25 G/50ML
50 INJECTION, SOLUTION INTRAVENOUS
Status: DISCONTINUED | OUTPATIENT
Start: 2024-03-22 | End: 2024-03-22 | Stop reason: HOSPADM

## 2024-03-22 RX ORDER — HYDROMORPHONE HYDROCHLORIDE 1 MG/ML
0.2 INJECTION, SOLUTION INTRAMUSCULAR; INTRAVENOUS; SUBCUTANEOUS EVERY 5 MIN PRN
Status: DISCONTINUED | OUTPATIENT
Start: 2024-03-22 | End: 2024-03-22

## 2024-03-22 RX ORDER — PROCHLORPERAZINE EDISYLATE 5 MG/ML
5 INJECTION INTRAMUSCULAR; INTRAVENOUS EVERY 8 HOURS PRN
Status: DISCONTINUED | OUTPATIENT
Start: 2024-03-22 | End: 2024-03-22

## 2024-03-22 RX ORDER — ONDANSETRON 2 MG/ML
4 INJECTION INTRAMUSCULAR; INTRAVENOUS EVERY 6 HOURS PRN
Status: DISCONTINUED | OUTPATIENT
Start: 2024-03-22 | End: 2024-03-22

## 2024-03-22 RX ORDER — NALOXONE HYDROCHLORIDE 0.4 MG/ML
0.08 INJECTION, SOLUTION INTRAMUSCULAR; INTRAVENOUS; SUBCUTANEOUS AS NEEDED
Status: DISCONTINUED | OUTPATIENT
Start: 2024-03-22 | End: 2024-03-22

## 2024-03-22 RX ORDER — DIPHENHYDRAMINE HYDROCHLORIDE 50 MG/ML
12.5 INJECTION INTRAMUSCULAR; INTRAVENOUS AS NEEDED
Status: DISCONTINUED | OUTPATIENT
Start: 2024-03-22 | End: 2024-03-22

## 2024-03-22 RX ORDER — HYDROCODONE BITARTRATE AND ACETAMINOPHEN 5; 325 MG/1; MG/1
1 TABLET ORAL ONCE AS NEEDED
Status: DISCONTINUED | OUTPATIENT
Start: 2024-03-22 | End: 2024-03-22

## 2024-03-22 RX ORDER — NICOTINE POLACRILEX 4 MG
15 LOZENGE BUCCAL
Status: DISCONTINUED | OUTPATIENT
Start: 2024-03-22 | End: 2024-03-22 | Stop reason: HOSPADM

## 2024-03-22 RX ORDER — ACETAMINOPHEN 500 MG
1000 TABLET ORAL ONCE
Status: DISCONTINUED | OUTPATIENT
Start: 2024-03-22 | End: 2024-03-22 | Stop reason: HOSPADM

## 2024-03-22 RX ORDER — CEFAZOLIN SODIUM/WATER 2 G/20 ML
2 SYRINGE (ML) INTRAVENOUS ONCE
Status: COMPLETED | OUTPATIENT
Start: 2024-03-22 | End: 2024-03-22

## 2024-03-22 RX ORDER — MIDAZOLAM HYDROCHLORIDE 1 MG/ML
INJECTION INTRAMUSCULAR; INTRAVENOUS AS NEEDED
Status: DISCONTINUED | OUTPATIENT
Start: 2024-03-22 | End: 2024-03-22 | Stop reason: SURG

## 2024-03-22 RX ADMIN — SODIUM CHLORIDE, SODIUM LACTATE, POTASSIUM CHLORIDE, CALCIUM CHLORIDE: 600; 310; 30; 20 INJECTION, SOLUTION INTRAVENOUS at 11:32:00

## 2024-03-22 RX ADMIN — ROPIVACAINE HYDROCHLORIDE 25 ML: 5 INJECTION, SOLUTION EPIDURAL; INFILTRATION; PERINEURAL at 11:39:00

## 2024-03-22 RX ADMIN — CEFAZOLIN SODIUM/WATER 2 G: 2 G/20 ML SYRINGE (ML) INTRAVENOUS at 11:49:00

## 2024-03-22 RX ADMIN — METOCLOPRAMIDE HYDROCHLORIDE 10 MG: 5 INJECTION INTRAMUSCULAR; INTRAVENOUS at 11:50:00

## 2024-03-22 RX ADMIN — LIDOCAINE HYDROCHLORIDE 50 MG: 10 INJECTION, SOLUTION EPIDURAL; INFILTRATION; INTRACAUDAL; PERINEURAL at 11:41:00

## 2024-03-22 RX ADMIN — ONDANSETRON 4 MG: 2 INJECTION INTRAMUSCULAR; INTRAVENOUS at 13:06:00

## 2024-03-22 RX ADMIN — DEXAMETHASONE SODIUM PHOSPHATE 2 MG: 10 INJECTION, SOLUTION INTRAMUSCULAR; INTRAVENOUS at 11:39:00

## 2024-03-22 RX ADMIN — MIDAZOLAM HYDROCHLORIDE 2 MG: 1 INJECTION INTRAMUSCULAR; INTRAVENOUS at 11:33:00

## 2024-03-22 NOTE — DISCHARGE INSTRUCTIONS
Ottoniel Koch MD  Hand and Upper Extremity Surgeon  [facility]  [email or web address]    Please keep splint on at all times and sling  NON weight bearing through left arm  Follow up in 2 weeks and keep splint on until post-op visit  Norco for pain, transition to aleve and tylenol  Wiggle fingers    POST-OPERATIVE INSTRUCTIONS    Please visit [email or web address] to learn more about your condition, procedure, preoperative and postoperative care. There are also directions for postoperative pain control and home exercises. Look under the patient resources tab.    Follow up  Your follow up should have been made for you before the surgery  If you do not have a follow up day or have questions regarding it, call [phone]    Therapy:   If we discussed the need for therapy, they will reach out to you   Please call [phone] if they do not reach out to you in 2-3 days  If we discussed seeing therapy before your postoperative visit, please schedule accordingly    You may be placed in one of the following dressings after surgery:  (yes) Cast/Splint: please keep your cast/splint clean and dry until follow up. Place a bag over it when you shower. If this gets wet, please call our office immediately.   Showers  Keep dressing and/or wound dry until you see me in office    Activity  Please remain non-weight bearing to the operative extremity with only gentle activity, make a fist often and move fingers.    Driving  You should refrain from driving if you are on narcotic pain medications (Tramadol, Norco, Percocet… etc.). If you are off narcotic pain medications, you may start driving when you feel comfortable and safe doing so. Please use caution before starting to drive again.    Pain Medication Plan  Your pain medications should have been prescribed electronically to your pharmacy  The pain medications I have recommended for your procedure are norco, aleve and tylenol  Please call [phone] if you have issues  with these medications    Over the counter pain medications  Start with over the counter pain medications first.  Naproxen (Brand name Aleve)   Comes in 220 mg tablets  Take this once in the morning and once at night initially, with food  If this does not control your pain, you can take 2 pills in the morning and 2 at night.   Do not take this if you have medical conditions affecting your heart/kidneys  Acetaminophen (Brand name Tylenol) Extra Strength  Comes in 650 mg tablets  You can take tylenol 3 times a day for a maximum of 3000 mg a day  Do not take this if you have medical conditions affecting your liver  Docusate Sodium (Brand name Colace)  This medication will help you if you become constipated from narcotic pain medications    Medications you may have been prescribed  If you were prescribed Norco, Vicodin, or Percocet, these should be taken to assist with sleeping at night, only if needed, for the first week. You may be given a written prescription, but many patients find they do not need to take these. Narcotic pain medications can lead to addiction, respiratory sedation, and death. Try to take these as little as possible if you can.    While on narcotic pain medication,  please refrain from:  Driving  Operating Heavy Machinery/Power Tools  Drinking alcohol  Staying by one's self  Making important decisions or signing legal documents    If you have any of the following signs or symptoms please proceed to your nearest Emergency Room:   Chest Pain  Shortness of Breath/ Difficulty Breathing  Excessive Bleeding  Anything else you deem an emergency    Please call the surgical team if you have the following:   Excessive swelling  Foul smelling odor from your wounds or dressings  Discharge from your surgical wounds  Persistent pain that does not get better with the prescription pain medication  Persistent nausea and/or vomiting  Fevers greater than 101.1 after the first 48 hrs post op  Dramatic changes to you  post operative pain    You can resume all of your home medications after surgery with the following exceptions:   Immune Modulating Drugs: These are drugs for rheumatoid arthritis, psoriasis and chemotherapy. If you are taking these drugs please check with the surgical team.   Steroids: please ask the surgical team when to restart your normal steroid doses.   Blood thinners: these are medications like coumadin may be held post operatively in some cases, please check with your surgical team or your surgeon's nurse.      Please do not hesitate to contact us with questions or concerns:   If you have issues during the day, please call the orthopedic clinic at [phone]  If you have urgent questions after hours please call [phone] and ask to page the PA on call    Please visit [email or web address] to learn more about your condition, procedure, preoperative and postoperative care. There are also directions for postoperative pain control and home exercises.    If you’ve been happy with your care, please fill out our Press PaySimpleey Survey emailed by [facility] or write a review on Google for me (link can be pulled up with the QR code below).   We appreciate all feedback!      Scan me or take a photo to write a Google Review!

## 2024-03-22 NOTE — ANESTHESIA PROCEDURE NOTES
Regional Block    Date/Time: 3/22/2024 11:37 AM    Performed by: Patricia Jay CRNA  Authorized by: Jonnie Caro MD      General Information and Staff    Start Time:  3/22/2024 11:37 AM  End Time:  3/22/2024 11:39 AM  Anesthesiologist:  Jonnie Caro MD  Performed by:  Anesthesiologist  Patient Location:  OR    Block Placement: Pre Induction  Site Identification: real time ultrasound guided and image stored and retrievable    Block site/laterality marked before start: site marked  Reason for Block: at surgeon's request and post-op pain management    Preanesthetic Checklist: 2 patient identifers, IV checked, risks and benefits discussed, monitors and equipment checked, pre-op evaluation, timeout performed, anesthesia consent, sterile technique used, no prohibitive neurological deficits and no local skin infection at insertion site      Procedure Details    Patient Position:  Supine  Prep: ChloraPrep    Monitoring:  Cardiac monitor, continuous pulse ox and blood pressure cuff  Block Type:  Supraclavicular  Laterality:  Left  Injection Technique:  Single-shot    Needle    Needle Type:  Short-bevel and echogenic  Needle Gauge:  21 G  Needle Length:  100 mm  Needle Localization:  Ultrasound guidance  Reason for Ultrasound Use: appropriate spread of the medication was noted in real time and no ultrasound evidence of intravascular and/or intraneural injection            Assessment    Injection Assessment:  Good spread noted, negative resistance, negative aspiration for heme, incremental injection, low pressure, local visualized surrounding nerve on ultrasound and no pain on injection  Heart Rate Change: No    - Patient tolerated block procedure well without evidence of immediate block related complications.     Medications  3/22/2024 11:37 AM      Additional Comments    Medication:  Ropivacaine 0.5% 25mL with 1:200,000 epi and 2mg PF decadron

## 2024-03-22 NOTE — INTERVAL H&P NOTE
Pre-op Diagnosis: OLECRANON FRACTURE, LEFT CLOSED, WITH ROUTINE HEALING, SUBSEQUENT ENCOUNTER, S/P ORIF OPEN REDUCTION, INTERNAL FIXATION FRACTURE    The above referenced H&P was reviewed by Ottoniel Koch MD on 3/22/2024, the patient was examined and no significant changes have occurred in the patient's condition since the H&P was performed.  I discussed with the patient and/or legal representative the potential benefits, risks and side effects of this procedure; the likelihood of the patient achieving goals; and potential problems that might occur during recuperation.  I discussed reasonable alternatives to the procedure, including risks, benefits and side effects related to the alternatives and risks related to not receiving this procedure.  We will proceed with procedure as planned.    Patient was seen and examined  Patient is in no acute distress  Regular rate and rhythm  Unlabored respiration    Ottoniel Koch MD  [facility] Certified Orthopedic Surgeon  Hand and Upper Extremity Specialist  [phone] Scheduling Line  [email or web address]

## 2024-03-22 NOTE — OR NURSING
Per Dr. Koch no need to repeat platelets/cbc pre-operatively. Will draw CMP pre-op as per orders.

## 2024-03-22 NOTE — OPERATIVE REPORT
OPERATIVE REPORT    Patient Name: Michael Moon Jr.  Age:  43 year old  Sex: male  MRN: [id number]  : 10/28/1980  Date of Admission: 3/22/2024  Date of Surgery: 24  Primary Surgeon: Ottoniel Koch MD  Assistant: Vern OSBORNE  Skilled assistance was needed for patient positioning, prepping and draping, instrument holding and passing, retracting and suturing.        Preoperative Diagnosis:   Left elbow olecranon fracture failed fixation     Postoperative Diagnosis:   Left elbow olecranon failed fracture fixation  Triceps failure     Operation Performed:   Left elbow removal of deep hardware CPT code 54222  Left olecranon proximal ulna open reduction internal fixation, CPT code 74316  Left elbow triceps repair, CPT code 85162     Implants:   Removed Skeletal dynamics proximal ulna plate  Placed synthes proximal olecranon plate  Arthrex 4.75 swivel lock with fiberwire and suture tape     Anesthesia: General + regional     Complications: none     Estimated Blood Loss: 10 mL     Tourniquet Time: See OR record     Specimens: none     Antibiotics: given     INDICATIONS FOR SURGERY:   The patient is a 43 year old year-old male with left elbow olecranon fracture, he was fixed 3 weeks ago but his fixation failed and he pulled out.  The risks of surgery include, but are not limited to, bleeding, infection, injury to neurovascular structures, DVT, PE, other medical complications. The patient understood all of these and gave consent to proceed freely.      OPERATIVE FINDINGS:  Left elbow olecranon fracture failure, triceps failure     OPERATIVE PROCEDURE:  The patient was seen in the preoperative holding area, where the correct site was marked.  All questions regarding the procedure and postoperative rehabilitation were discussed.  The consent was signed. H&P and allergies were reviewed.     The patient was brought to the operating room and transferred to the OR table where general Anesthesia was induced without immediate  complication. The anesthesia team then performed a regional block on the upper extremity without complications. General anesthetic was then initiated by Anesthesia. A high axillary tourniquet that was well padded was placed on the operative arm. The patient was then positioned with a bump under the hip and the operative upper extremity was prepped and draped  the standard fashion. A single dose of preoperative antibiotics was administered. A time-out was performed confirming laterality and site.     A midline incision overlying the olecranon and proximal ulna was designed and marked. This incision curved radially around the tip of the olecranon. The extremity was then exsanguinated at 250 mm hg. Skin was incised sharply. The subcutaneous tissue proximally was dissected with bovie cautery until the triceps musculature was encountered. Flaps ulnar and radially were lifted. Distally, the dissection was brought down to the distal ulna and the ECU and FCU fascia were lifted off the ulnar shaft     The fracture was encountered and fracture hematoma was washed away with irrigation as well as with a freer elevator. The previous plate was removed and the proximal fragment was still intact. The triceps was torn off the ulnar side of the proximal ulna  The fracture was then reduced with a pointed reduction clamp and two K wires were fired in crossed fashion to hold the provisional reduction.     Reduction was confirmed by fluoroscopy. A left olecranon plate was then selected and placed on the ulna and secured with K wires. Plate position was confirmed and deemed adequate. A distal cortical screw was then placed to bring the plate to the bone and then the proximal home run screw was placed in locking fashion.      Xrays were then taken and screw and plate position were confirmed.   I placed 4 additional proximal locking screws  I placed 3 additional distal locking screws   Final films were taken and deemed adequate     A 4.75  arthrex swivel lock was then placed into the distal unlanr shaft and krakow sutures were ran up and down the triceps tendon down to the remnant tendon     The wound was irrigated, the distal forearm fascia was closed with a running 2-0 vicryl, subdermal layer closed with 3-0 monocryl and then skin was closed with staples     Wound was dressed with bacitracin, adaptic, 4x4s and webril. A long arm well padded post mold splint was placed with a side slab.     The patient was allowed to awaken from the anesthetic, appeared to have tolerated the procedure well without immediate complication, and was transferred to the recovery room in stable condition.     Patient will follow up in 2 weeks for wound inspection. Norco given for pain     Ottoniel Koch MD  Orthopedic Surgery

## 2024-03-22 NOTE — ANESTHESIA POSTPROCEDURE EVALUATION
[facility]    Michael Moon  Patient Status:  Hospital Outpatient Surgery   Age/Gender 43 year old male MRN [id number]   Location [facility] SURGERY Attending Ottoniel Koch MD   Hosp Day # 0 PCP Ottoniel Holden MD       Anesthesia Post-op Note    LEFT ELBOW REVISION OLECRANON OPEN REDUCTION INTERNAL FIXATION, LEFT ELBOW PLATE REMOVAL, TRICEPS REPAIR - LEFT    Procedure Summary       Date: 03/22/24 Room / Location:  MAIN OR 12 / [facility] MAIN OR    Anesthesia Start: 1132 Anesthesia Stop: 1337    Procedure: LEFT ELBOW REVISION OLECRANON OPEN REDUCTION INTERNAL FIXATION, LEFT ELBOW PLATE REMOVAL, TRICEPS REPAIR - LEFT (Left) Diagnosis: (OLECRANON FRACTURE, LEFT CLOSED, WITH ROUTINE HEALING, SUBSEQUENT ENCOUNTER, S/P ORIF OPEN REDUCTION, INTERNAL FIXATION FRACTURE)    Surgeons: Ottoniel Koch MD Anesthesiologist: Jonnie Caro MD    Anesthesia Type: general ASA Status: 3            Anesthesia Type: general    Vitals Value Taken Time   /64 03/22/24 1340   Temp 98.3 °F (36.8 °C) 03/22/24 1340   Pulse 96 03/22/24 1340   Resp 14 03/22/24 1340   SpO2 95 % 03/22/24 1340       Patient Location: PACU    Anesthesia Type: general    Airway Patency: patent    Postop Pain Control: adequate    Mental Status: mildly sedated but able to meaningfully participate in the post-anesthesia evaluation    Nausea/Vomiting: none    Cardiopulmonary/Hydration status: stable euvolemic    Complications: no apparent anesthesia related complications    Postop vital signs: stable    Comments: Pt breathing comfortably, VSS, report to RN.     Dental Exam: Unchanged from Preop    Patient to be discharged from PACU when criteria met.

## 2024-03-22 NOTE — ANESTHESIA PROCEDURE NOTES
Airway  Date/Time: 3/22/2024 11:43 AM  Urgency: elective    Airway not difficult    General Information and Staff    Patient location during procedure: OR  Anesthesiologist: Jonnie Caro MD  Resident/CRNA: Patricia Jay CRNA  Performed: CRNA   Performed by: Patricia Jay CRNA  Authorized by: Jonnie Caro MD      Indications and Patient Condition  Indications for airway management: anesthesia  Spontaneous Ventilation: absent  Sedation level: deep  Preoxygenated: yes  Patient position: sniffing  Mask difficulty assessment: 1 - vent by mask    Final Airway Details  Final airway type: supraglottic airway      Successful airway: classic  Size 4       Number of attempts at approach: 1

## 2024-03-22 NOTE — ANESTHESIA PREPROCEDURE EVALUATION
PRE-OP EVALUATION    Patient Name: Michael Moon Jr.    Admit Diagnosis: OLECRANON FRACTURE, LEFT CLOSED, WITH ROUTINE HEALING, SUBSEQUENT ENCOUNTER, S/P ORIF OPEN REDUCTION, INTERNAL FIXATION FRACTURE    Pre-op Diagnosis: OLECRANON FRACTURE, LEFT CLOSED, WITH ROUTINE HEALING, SUBSEQUENT ENCOUNTER, S/P ORIF OPEN REDUCTION, INTERNAL FIXATION FRACTURE    LEFT ELBOW REVISION OLECRANON OPEN REDUCTION INTERNAL FIXATION, LEFT ELBOW PLATE REMOVAL, POSSIBLE TRICEPS ADVANCEMENT - LEFT    Anesthesia Procedure: LEFT ELBOW REVISION OLECRANON OPEN REDUCTION INTERNAL FIXATION, LEFT ELBOW PLATE REMOVAL, POSSIBLE TRICEPS ADVANCEMENT - LEFT (Left)    Surgeon(s) and Role:     * Ottoniel Koch MD - Primary    Pre-op vitals reviewed.  Temp: 97.8 °F (36.6 °C)  Pulse: 74  Resp: 16  BP: 123/81  SpO2: 100 %  Body mass index is 23.77 kg/m².    Current medications reviewed.  Hospital Medications:   acetaminophen (Tylenol Extra Strength) tab 1,000 mg  1,000 mg Oral Once    scopolamine (Transderm-Scop) 1 MG/3DAYS patch 1 patch  1 patch Transdermal Once    glucose (Dex4) 15 GM/59ML oral liquid 15 g  15 g Oral Q15 Min PRN    Or    glucose (Glutose) 40% oral gel 15 g  15 g Oral Q15 Min PRN    Or    glucose-vitamin C (Dex-4) chewable tab 4 tablet  4 tablet Oral Q15 Min PRN    Or    dextrose 50% injection 50 mL  50 mL Intravenous Q15 Min PRN    Or    glucose (Dex4) 15 GM/59ML oral liquid 30 g  30 g Oral Q15 Min PRN    Or    glucose (Glutose) 40% oral gel 30 g  30 g Oral Q15 Min PRN    Or    glucose-vitamin C (Dex-4) chewable tab 8 tablet  8 tablet Oral Q15 Min PRN    lactated ringers infusion   Intravenous Continuous    ceFAZolin (Ancef) 2 g in 20mL IV syringe premix  2 g Intravenous Once       Outpatient Medications:     Medications Prior to Admission   Medication Sig Dispense Refill Last Dose    sertraline 25 MG Oral Tab Take 1 tablet (25 mg total) by mouth daily.   3/20/2024    oxyCODONE 5 MG Oral Tab Take 1 tablet (5 mg total) by mouth  every 4 (four) hours as needed for Pain. 30 tablet 0 3/8/2024    Sildenafil Citrate 100 MG Oral Tab Take 1 tablet (100 mg total) by mouth daily as needed for Erectile Dysfunction. 60 tablet 3     lisinopril 5 MG Oral Tab Take 1 tablet (5 mg total) by mouth daily. 90 tablet 3 3/21/2024 at 1500    pioglitazone 30 MG Oral Tab Take 1 tablet (30 mg total) by mouth daily. 90 tablet 3 3/21/2024 at 1500    glimepiride 4 MG Oral Tab Take 2 tablets (8 mg total) by mouth once daily. 180 tablet 3 3/21/2024 at 1500    metFORMIN HCl  MG Oral Tablet 24 Hr Take 2 tablets (1,500 mg total) by mouth once daily. 180 tablet 3 3/21/2024 at 1500    Insulin Pen Needle (PEN NEEDLES 5/16\") 31G X 8 MM Does not apply Misc Use daily with insulin 100 each 3     Elastic Bandages & Supports (JOBST FOR MEN 15-20MMHG LG) Does not apply Misc Wear both legs daily 4 each 5     FREESTYLE LITE TEST In Vitro Strip TEST DAILY 100 strip 3     Blood Glucose Monitoring Suppl (FREESTYLE FREEDOM LITE) w/Device Does not apply Kit Test blood sugars once daily 1 kit 0     FREESTYLE LANCETS Does not apply Misc 1 each by Finger stick route daily. 100 each 2        Allergies: Patient has no known allergies.      Anesthesia Evaluation    Patient summary reviewed.    Anesthetic Complications  (-) history of anesthetic complications         GI/Hepatic/Renal      (+) GERD          (+) liver disease (alcoholic cirrhosis.  Stopped ETOH 5/15.  Portal HTN, non bleeding varices on CT)                 Cardiovascular      ECG reviewed.  Exercise tolerance: good     MET: >4      (+) hypertension                                     Endo/Other      (+) diabetes and poorly controlled, type 2, not using insulin              (+) thrombocytopenia           Pulmonary  Comment: Smokes 1ppd for 6 years                  (+) sleep apnea and CPAP      Neuro/Psych      (+) depression  (+) anxiety         (+) neuromuscular disease (diabteic neuropathy)                   Past Surgical  History:   Procedure Laterality Date    SINUS SURGERY        WISDOM TEETH REMOVED       Social History     Socioeconomic History    Marital status: Single   Tobacco Use    Smoking status: Every Day     Packs/day: 0.50     Years: 6.00     Additional pack years: 0.00     Total pack years: 3.00     Types: Cigarettes    Smokeless tobacco: Never   Vaping Use    Vaping Use: Never used   Substance and Sexual Activity    Alcohol use: Not Currently     Comment: 1-2 BOTTLES OF RUM    Drug use: Not Currently     History   Drug Use Unknown     Available pre-op labs reviewed.               Airway      Mallampati: III  Mouth opening: 3 FB  TM distance: > 6 cm  Neck ROM: full Cardiovascular    Cardiovascular exam normal.  Rhythm: regular  Rate: normal  (-) murmur   Dental    Dentition appears grossly intact         Pulmonary    Pulmonary exam normal.  Breath sounds clear to auscultation bilaterally.               Other findings              ASA: 3   Plan: general  NPO status verified and patient meets guidelines.    Post-procedure pain management plan discussed with surgeon and patient.  Surgeon requests: regional block  Comment: GA discussed.  Risk of PONV, cough, sore throat explained.  The risks and benefits of regional anesthesia have been explained to the patient as indicated on the anesthesia consent form, which has been signed. All questions answered.    Plan/risks discussed with: patient                Present on Admission:  **None**

## (undated) DEVICE — PROXIMATE SKIN STAPLERS (35 WIDE) CONTAINS 35 STAINLESS STEEL STAPLES (FIXED HEAD): Brand: PROXIMATE

## (undated) DEVICE — GLOVE SUR 7 SENSICARE PI PIP CRM PWD F

## (undated) DEVICE — C-ARMOR C-ARM EQUIPMENT COVERS CLEAR STERILE UNIVERSAL FIT 12 PER CASE: Brand: C-ARMOR

## (undated) DEVICE — C-ARM: Brand: UNBRANDED

## (undated) DEVICE — OINTMENT SKIN 3 ANTIBIO BACI ZN NEOMYCN SULF

## (undated) DEVICE — Device

## (undated) DEVICE — SUT VCRL 0 36IN CT-1 ABSRB UD L36MM 1/2 CIR

## (undated) DEVICE — SOLUTION IRRIG 1000ML 0.9% NACL USP BTL

## (undated) DEVICE — KIT BEACH CHR FOAM W/ SUPP ARM DRP

## (undated) DEVICE — STOCKINETTE,DOUBLE PLY,6X48,STERILE: Brand: MEDLINE

## (undated) DEVICE — SUTURE MCRYL 3-0 27IN ABSRB UD 19MM PS-2 3/8

## (undated) DEVICE — DRAPE,TOWEL,LARGE,INVISISHIELD: Brand: MEDLINE

## (undated) DEVICE — SLEEVE COMPR M KNEE LEN SGL USE KENDALL SCD

## (undated) DEVICE — DISPOSABLE BIPOLAR FORCEPS 4" (10.2CM) JEWELERS, STRAIGHT 0.4MM TIP AND 12 FT. (3.6M) CABLE: Brand: KIRWAN

## (undated) DEVICE — DRAPE,EXTREMITY,89X128,STERILE: Brand: MEDLINE

## (undated) DEVICE — PADDING CAST 4IN X 144IN WHT COT SYN RL N ADH

## (undated) DEVICE — BIT DRL 2.5X110MM G QC FOR LOK COMPR

## (undated) DEVICE — ORTHO CDS-LF: Brand: MEDLINE INDUSTRIES, INC.

## (undated) DEVICE — BIT DRL 165MM 2.8MM QC FOR PRO-PK PROX HUM

## (undated) DEVICE — SLING ORTH MED 15X8.5IN 32IN

## (undated) DEVICE — BIT DRL 2X140MM QC CALIB W/ DEPTH MRK

## (undated) DEVICE — VIOLET BRAIDED (POLYGLACTIN 910), SYNTHETIC ABSORBABLE SUTURE: Brand: COATED VICRYL

## (undated) DEVICE — DRESSING WET L16XW3IN OIL EMUL N ADH CURAD

## (undated) DEVICE — DISPOSABLE TOURNIQUET CUFF SINGLE BLADDER, DUAL PORT AND QUICK CONNECT CONNECTOR: Brand: COLOR CUFF

## (undated) DEVICE — SLEEVE COMPR MD KNEE LEN SGL USE KENDALL SCD

## (undated) DEVICE — BANDAGE COMPR W3XL180IN FOAM 1 LAYR SELF ADH

## (undated) DEVICE — GLOVE SUR 7.5 SENSICARE PI PIP CRM PWD F

## (undated) DEVICE — PLASTER SPLNT W4XL15IN HI DRY WET RETEN LO

## (undated) DEVICE — BANDAGE,ELASTIC,ESMARK,STERILE,6"X9',LF: Brand: MEDLINE

## (undated) DEVICE — HOOK LOCK LATEX FREE ELASTIC BANDAGE 4INX5YD

## (undated) DEVICE — SUTURE FIBERWIRE 2-0 L18IN NONABSORBABLE BLU

## (undated) DEVICE — SUT COAT VCRL 3-0 27IN CT-1 ABSRB UD 36MM 1/2

## (undated) NOTE — LETTER
Patient Name: Michael Moon Jr.-Age / Sex: 10/28/1980-A: 43 y  male   Medical Records: [id number] CSN: [id number]    ABNORMAL VALUES  Surgeon(s):  Ottoniel Koch MD  Anesthesia Type: General  Date of Surgery: 3/22/2024  Procedure Description: LEFT ELBOW REVISION OLECRANON OPEN REDUCTION INTERNAL FIXATION, LEFT ELBOW PLATE REMOVAL, POSSIBLE TRICEPS ADVANCEMENT - LEFT  Primary Surgeon:  Ottoniel Koch MD  Phone Number: [phone]    PLEASE NOTE THE FOLLOWING ABNORMALITIES:   Hematology   low platelets 87  ________________________________________________________  Anesthesia to review patient's chart

## (undated) NOTE — LETTER
OUTSIDE TESTING RESULT REQUEST     IMPORTANT: FOR YOUR IMMEDIATE ATTENTION  Please FAX all test results listed below to: [phone]     Testing already done on or about: 24     * * * * If testing is NOT complete, arrange with patient A.S.A.P. * * * *      Patient Name: Michael Moon Jr.  Surgery Date: 3/4/2024  Medical Record: [id number]  CSN: [id number]  : 10/28/1980 - A: 43 y     Sex: male  Surgeon(s):  Ottoniel Koch MD  Procedure: LEFT ELBOW OLECRANON OPEN REDUCTION INTERNAL FIXATION  Anesthesia Type: General     Surgeon: Ottoniel Koch MD     The following Testing and Time Line are REQUIRED PER ANESTHESIA     EKG READ AND SIGNED WITHIN   90 days      Thank You,   Sent by: JENNIFER Torre